# Patient Record
Sex: MALE | Race: WHITE | NOT HISPANIC OR LATINO | URBAN - NONMETROPOLITAN AREA
[De-identification: names, ages, dates, MRNs, and addresses within clinical notes are randomized per-mention and may not be internally consistent; named-entity substitution may affect disease eponyms.]

---

## 2017-05-19 PROBLEM — E11.3293: Noted: 2017-05-19

## 2019-05-28 ENCOUNTER — IMPORTED ENCOUNTER (OUTPATIENT)
Dept: URBAN - NONMETROPOLITAN AREA CLINIC 1 | Facility: CLINIC | Age: 64
End: 2019-05-28

## 2019-05-28 PROCEDURE — 92134 CPTRZ OPH DX IMG PST SGM RTA: CPT

## 2019-05-28 PROCEDURE — 99214 OFFICE O/P EST MOD 30 MIN: CPT

## 2019-05-28 NOTE — PATIENT DISCUSSION
*Presbyopia:.-	  Discussed presbyopia with patient in detail. *Gls RX:.-	  A new spectacle prescription was created and dispensed today. *Diabetic Retinopathy:. trace NPDR-  Discussed findings of exam in detail with the patient. -  discussed the risk of diabetic damage of the retina with potential vision loss and the importance of routine follow-up. oct today/stable ou

## 2020-06-04 ENCOUNTER — IMPORTED ENCOUNTER (OUTPATIENT)
Dept: URBAN - NONMETROPOLITAN AREA CLINIC 1 | Facility: CLINIC | Age: 65
End: 2020-06-04

## 2020-06-04 PROBLEM — E11.3293: Noted: 2017-05-19

## 2020-06-04 PROBLEM — H25.13: Noted: 2020-06-04

## 2020-06-04 PROCEDURE — 92014 COMPRE OPH EXAM EST PT 1/>: CPT

## 2020-06-04 PROCEDURE — 92015 DETERMINE REFRACTIVE STATE: CPT

## 2020-06-04 NOTE — PATIENT DISCUSSION
*Presbyopia:.-	  Discussed presbyopia with patient in detail. *Gls RX:.-	  A new spectacle prescription was created and dispensed today. *Diabetic Retinopathy:. trace NPDR-  Discussed findings of exam in detail with the patient. -  discussed the risk of diabetic damage of the retina with potential vision loss and the importance of routine follow-up. Cataract OU-Not yet surgical. -Reviewed symptoms of advancing cataract growth such as glare and halos and decreased vision.-Continue to monitor for now. Pt will notify us if any new symptoms develop.

## 2021-07-07 ENCOUNTER — IMPORTED ENCOUNTER (OUTPATIENT)
Dept: URBAN - NONMETROPOLITAN AREA CLINIC 1 | Facility: CLINIC | Age: 66
End: 2021-07-07

## 2021-07-07 PROBLEM — H25.13: Noted: 2021-07-07

## 2021-07-07 PROBLEM — E11.3293: Noted: 2021-07-07

## 2021-07-07 PROCEDURE — 92015 DETERMINE REFRACTIVE STATE: CPT

## 2021-07-07 PROCEDURE — 92014 COMPRE OPH EXAM EST PT 1/>: CPT

## 2021-07-07 NOTE — PATIENT DISCUSSION
DM mild npdr-Stressed the importance of keeping blood sugars under control blood pressure under control and weight normalization and regular visits with PCP. -Explained the possible effects of poorly controlled diabetes and the damage that diabetes can cause to ocular health. -Patient to check HgbA1C.-Pt instructed to contact our office with any vision changes. Cataract OU-Not yet surgical. -Reviewed symptoms of advancing cataract growth such as glare and halos and decreased vision.-Continue to monitor for now. Pt will notify us if any new symptoms develop.

## 2022-04-15 ASSESSMENT — VISUAL ACUITY
OD_CC: 20/20
OD_SC: 20/25-1
OU_CC: J1+
OS_CC: 20/20
OU_CC: 20/20
OS_SC: 20/20
OD_SC: 20/20
OD_CC: 20/20
OS_CC: 20/20
OD_CC: J1
OU_CC: 20/20
OS_CC: J1
OS_SC: 20/25-2

## 2022-04-15 ASSESSMENT — TONOMETRY
OS_IOP_MMHG: 17
OD_IOP_MMHG: 17
OS_IOP_MMHG: 16
OD_IOP_MMHG: 18
OS_IOP_MMHG: 17
OD_IOP_MMHG: 18

## 2022-08-23 ENCOUNTER — COMPREHENSIVE EXAM (OUTPATIENT)
Dept: RURAL CLINIC 1 | Facility: CLINIC | Age: 67
End: 2022-08-23

## 2022-08-23 DIAGNOSIS — E11.3293: ICD-10-CM

## 2022-08-23 DIAGNOSIS — H25.13: ICD-10-CM

## 2022-08-23 DIAGNOSIS — H52.4: ICD-10-CM

## 2022-08-23 PROCEDURE — 92014 COMPRE OPH EXAM EST PT 1/>: CPT

## 2022-08-23 PROCEDURE — 92015 DETERMINE REFRACTIVE STATE: CPT

## 2022-08-23 PROCEDURE — 92134 CPTRZ OPH DX IMG PST SGM RTA: CPT

## 2022-08-23 ASSESSMENT — TONOMETRY
OD_IOP_MMHG: 17
OS_IOP_MMHG: 17

## 2022-08-23 ASSESSMENT — VISUAL ACUITY
OU_CC: 20/20
OD_CC: 20/20-1
OS_CC: 20/25

## 2023-08-29 ENCOUNTER — ESTABLISHED PATIENT (OUTPATIENT)
Dept: RURAL CLINIC 1 | Facility: CLINIC | Age: 68
End: 2023-08-29

## 2023-08-29 DIAGNOSIS — H35.361: ICD-10-CM

## 2023-08-29 DIAGNOSIS — E11.9: ICD-10-CM

## 2023-08-29 DIAGNOSIS — H25.13: ICD-10-CM

## 2023-08-29 PROCEDURE — 92134 CPTRZ OPH DX IMG PST SGM RTA: CPT

## 2023-08-29 PROCEDURE — 92014 COMPRE OPH EXAM EST PT 1/>: CPT

## 2023-08-29 ASSESSMENT — TONOMETRY
OS_IOP_MMHG: 17
OD_IOP_MMHG: 18

## 2023-08-29 ASSESSMENT — VISUAL ACUITY
OS_SC: 20/25-2
OD_SC: 20/20
OU_SC: 20/20

## 2024-08-30 ENCOUNTER — COMPREHENSIVE EXAM (OUTPATIENT)
Dept: RURAL CLINIC 1 | Facility: CLINIC | Age: 69
End: 2024-08-30

## 2024-08-30 DIAGNOSIS — H35.361: ICD-10-CM

## 2024-08-30 DIAGNOSIS — E11.9: ICD-10-CM

## 2024-08-30 DIAGNOSIS — H25.13: ICD-10-CM

## 2024-08-30 PROCEDURE — 92014 COMPRE OPH EXAM EST PT 1/>: CPT

## 2024-08-30 PROCEDURE — 92134 CPTRZ OPH DX IMG PST SGM RTA: CPT

## 2024-08-30 ASSESSMENT — VISUAL ACUITY
OU_SC: 20/20
OD_SC: 20/20-1
OU_CC: 20/20-1
OS_SC: 20/20-2
OS_CC: 20/20-1
OD_CC: 20/20-1

## 2024-08-30 ASSESSMENT — TONOMETRY
OS_IOP_MMHG: 9
OD_IOP_MMHG: 10